# Patient Record
Sex: MALE | Race: WHITE | NOT HISPANIC OR LATINO
[De-identification: names, ages, dates, MRNs, and addresses within clinical notes are randomized per-mention and may not be internally consistent; named-entity substitution may affect disease eponyms.]

---

## 2019-12-02 ENCOUNTER — RESULT REVIEW (OUTPATIENT)
Age: 64
End: 2019-12-02

## 2021-06-14 PROBLEM — Z00.00 ENCOUNTER FOR PREVENTIVE HEALTH EXAMINATION: Status: ACTIVE | Noted: 2021-06-14

## 2021-06-15 ENCOUNTER — NON-APPOINTMENT (OUTPATIENT)
Age: 66
End: 2021-06-15

## 2021-06-15 DIAGNOSIS — Z86.39 PERSONAL HISTORY OF OTHER ENDOCRINE, NUTRITIONAL AND METABOLIC DISEASE: ICD-10-CM

## 2021-06-15 DIAGNOSIS — J30.9 ALLERGIC RHINITIS, UNSPECIFIED: ICD-10-CM

## 2021-06-15 DIAGNOSIS — M51.9 UNSPECIFIED THORACIC, THORACOLUMBAR AND LUMBOSACRAL INTERVERTEBRAL DISC DISORDER: ICD-10-CM

## 2021-06-15 DIAGNOSIS — Z87.09 PERSONAL HISTORY OF OTHER DISEASES OF THE RESPIRATORY SYSTEM: ICD-10-CM

## 2021-06-15 DIAGNOSIS — Z82.5 FAMILY HISTORY OF ASTHMA AND OTHER CHRONIC LOWER RESPIRATORY DISEASES: ICD-10-CM

## 2021-06-15 DIAGNOSIS — Z87.39 PERSONAL HISTORY OF OTHER DISEASES OF THE MUSCULOSKELETAL SYSTEM AND CONNECTIVE TISSUE: ICD-10-CM

## 2021-06-15 DIAGNOSIS — Z80.1 FAMILY HISTORY OF MALIGNANT NEOPLASM OF TRACHEA, BRONCHUS AND LUNG: ICD-10-CM

## 2021-06-15 DIAGNOSIS — F17.200 NICOTINE DEPENDENCE, UNSPECIFIED, UNCOMPLICATED: ICD-10-CM

## 2021-06-15 DIAGNOSIS — Z82.0 FAMILY HISTORY OF EPILEPSY AND OTHER DISEASES OF THE NERVOUS SYSTEM: ICD-10-CM

## 2021-06-15 RX ORDER — DOCUSATE SODIUM 100 MG/1
100 CAPSULE ORAL
Refills: 0 | Status: ACTIVE | COMMUNITY

## 2021-06-15 RX ORDER — ATORVASTATIN CALCIUM 10 MG/1
10 TABLET, FILM COATED ORAL
Refills: 0 | Status: ACTIVE | COMMUNITY

## 2021-06-15 RX ORDER — OMEPRAZOLE 20 MG/1
20 CAPSULE, DELAYED RELEASE ORAL
Refills: 0 | Status: ACTIVE | COMMUNITY

## 2021-06-18 ENCOUNTER — NON-APPOINTMENT (OUTPATIENT)
Age: 66
End: 2021-06-18

## 2021-06-18 ENCOUNTER — APPOINTMENT (OUTPATIENT)
Dept: GASTROENTEROLOGY | Facility: CLINIC | Age: 66
End: 2021-06-18
Payer: COMMERCIAL

## 2021-06-18 VITALS
TEMPERATURE: 97.6 F | BODY MASS INDEX: 28.31 KG/M2 | DIASTOLIC BLOOD PRESSURE: 74 MMHG | WEIGHT: 209 LBS | SYSTOLIC BLOOD PRESSURE: 136 MMHG | HEIGHT: 72 IN

## 2021-06-18 PROCEDURE — 99215 OFFICE O/P EST HI 40 MIN: CPT

## 2021-06-18 PROCEDURE — 99072 ADDL SUPL MATRL&STAF TM PHE: CPT

## 2021-06-18 NOTE — REVIEW OF SYSTEMS
[Fever] : no fever [Chills] : no chills [Dysuria] : no dysuria [Skin Lesions] : no skin lesions [Confused] : no confusion [Suicidal] : not suicidal [Proptosis] : no proptosis [Easy Bruising] : no tendency for easy bruising

## 2021-06-18 NOTE — HISTORY OF PRESENT ILLNESS
[de-identified] : \par \par This HPI reflects a summary and review of records : including previous and most recent  Labs, body imaging, consults and progress notes, operative and pathology reports, EKG reports, ED records, found in [employer], [employer],  [employer] and any additional records brought in by  the patient at the time of the visit.\par \par \par \par PCP:  Francis\par \par 66 yo M w h/o asthma, HLD, Allergies, OA, Lumbar Disc Dis\par GERD w LPR, Gastric Polyps, Gastritis w + IM\par Colon Polyps, Hemorrhoids, IBS/Proctalgia Fugax\par \par 10/22/18 Colonoscopy: L-sided Hypertrophy, 2nd deg int hemorhoids\par 3/7/2017 EGD:  gastritis, no HP, 0.5cm antral nodule, +IM, gastric polyp: fundic, 1cm HH, GE-ajar, \par Esophagitis A--, no Barretts;  VC:TR\par \par reviewed endoscopic findings and pathology in detail with patient\par patients questioned answered\par \par \par Today:  Feeling well, no c/o , CP, SOB/ SWAIN, Cough, Wheeze, Palpitations, edema\par \par \par * Abd pain-no\par * Nausea-no\par * Vomit-no\par * Early satiety--no\par * Belching-no\par * Hiccups--no\par * Regurgitation-no\par * Acid Taste / Water Brash-no\par * Ht burn-no\par * Throat Clearing-intermittent \par * Post-Nasal Drip-no\par * Congestion-no\par * Globus-occas \par * Cough-no\par * Wheeze / PC--no\par * Hoarseness-no\par * Dysphagia-no\par * BMs: # 3/5 qd\par * Constipation-no\par * Diarrhea-no\par * Bloating-no\par * Strain on Defecation--no\par * Incompl Evac--no\par * Flatulence-no\par * Gurgling-no\par * Melena-no\par * BPBPR-no\par * Anorexia-no\par * Wt. Loss-no\par \par

## 2021-06-18 NOTE — ASSESSMENT
[FreeTextEntry1] : \par \par \par \par \par 1. GERD:  well  -  controlled,  no ht burn,  dysphagia, occas  throat clear\par * ++   LPR,  No Kilgore’s w No Dysplasia,  + h/o  Esophagitis grade: A--was found \par \par Recommend: \par * Anti-reflux diet & life-style changes reviewed & re-emphasized.  ++    Bedge required\par * Weight reduction & regular exercise emphasized\par \par *  ++   PPI:  omep 40mg qd ,  No  H2B q HS:  ,  No Carafate  1 gram:   --was emphasized\par I reviewed & summarized the prospective randomized & retrospective non-randomized studies\par looking at potential long term SE's of PPIs, w special attention to associations & actual cause\par as related to GI infections, bone loss, cognitive changes, KD, Covid, vitamin & electrolyte deficiencies\par questions were answered, pt advised that PPIs should be used when needed as indicated by their\par clinical indication and response and tapering off is always the goal if possible. pt understood.\par \par * No F/U  EGD:  [   8   ] mo.  /  [  2021     ] yr  --for Barretts screening / surveillance \par * No  need for pH Monitor,  No  Manometry,  No  Esophagram -- was emphasized \par *  ++  need for ENT  eval/F/U,  No  need for Surgical  eval  --  was emphasized \par \par \par \par \par \par 2. Gastritis :  well - controlled,  No N, V, early satiety, pain\par * No H.Pylori, + H/O  IM, No Bile, No NSAID , No ETOH exposure-- was found\par Recommended and reviewed: \par * Avoid NSAIDs / ETOH--have been shown to exacerbate and possible lead to cx's & GIBs\par * No Prevpac, No Pylera, is needed x  days\par * ++  PPI: Omep 40mg qd  x 90  days, or  No Carafate 1 gm QID is needed\par * ++  F/U EGD   is needed\par \par \par \par \par \par 3. Irritable Bowel Syndrome / ? Proctalgia Fugax   well - controlled.  No pain,  constipation,  diarrhea,  bloat\par Recommend: \par * Diet: High Fiber,  Low Fat & Lactose free, Low FODMAPs--was reviewed & emphasized\par * Daily fluid intake was reviewed : 6  --  8 cups of decaffeinated fluid daily was emphasized\par * Regular aerobic exercise was emphasized\par * Probiotics  1  daily\par * Miralax / Linzess/Amitiza--no required\par * Neuromodulation: reviewed but not required\par \par \par \par \par \par 4. History of gastric / Colon polyps\par see gastric/colorectal cancer screening\par \par \par \par \par \par 5. Colorectal  / Gastric Neoplasia Screening:  to be evaluated\par   Utilizing teaching posters and anatomical models the following were discussed and emphasized with the patient in detail: \par * Discussed the pre-malignant potential of polyps\par * Discussed the importance of f/u surveillance / screening colonoscopy\par * High-Fiber Diet was reviewed and emphasized\par * Anti-oxidants and ASA/NSAID Therapy reviewed\par * Given age > 40-51 yo & +PH Gastric/ Colonic Polyps \par * Recommend Colonoscopy--19/2023,  EGD--2021 \par * R/O  Colonic / Gastric  Neoplasia\par \par \par \par \par 6. Hemorrhoids:  well - controlled.  No pain,  swelling,  itch,  bleeding\par * Discussed the potential complications of thrombosis,  pain,  infection,  swelling, itching,  bleeding \par Recommendations: \par * High - Fiber Diet was reviewed and emphasized\par * 6  --  8 cups of decaffeinated fluid daily was emphasized \par * Sitz Bathes BID,  No:  Anusol HC  Suppos / Cream  FL BID -- was needed\par * No:  Tucks BID,   No:  Balneol Lotion,   No:  Calmoseptine Oint -- was needed ;    ++ Prep H prn\par * No:  need for  Colorectal surgical evaluation for possible ablation w Dr --  was emphasized\par \par \par \par \par \par Informed Consent:\par * The risks & Benefits of EGD   were discussed w patient.\par * This included but was not limited to perforation, bleeding, sedation /med rxns possibly requiring surgery, blood transfusions, antibiotics & CPR/Intubation.\par * Pt. understands & agrees to the procedures.\par The following instructions in regards to the prep and medically essential ( cardiac, pulmonary, sz, psych, endocrine)  pre-op medication administration\par was reviewed and emphasized with the patient . \par * Pt. advised to D/C  ASA/NSAIDs  7 Days  PTP.\par * [ +++ ]  Dulcolax / Miralax / Mag. Citrate ,  [     ] Prepopik/ Clenpiq ,  [     ] Osmo Prep,  [    ] GoLytely,  prep. reviewed w Pt.\par * Hold  [           ] AM of procedure.\par * Hold  [           ] PM of procedure.\par * Take  [           ] PM of procedure.\par * Take  [           ] AM of procedure.\par \par

## 2021-08-05 ENCOUNTER — RESULT REVIEW (OUTPATIENT)
Age: 66
End: 2021-08-05

## 2021-08-06 ENCOUNTER — APPOINTMENT (OUTPATIENT)
Dept: GASTROENTEROLOGY | Facility: HOSPITAL | Age: 66
End: 2021-08-06

## 2022-01-28 ENCOUNTER — NON-APPOINTMENT (OUTPATIENT)
Age: 67
End: 2022-01-28

## 2022-02-07 ENCOUNTER — APPOINTMENT (OUTPATIENT)
Dept: GASTROENTEROLOGY | Facility: CLINIC | Age: 67
End: 2022-02-07
Payer: COMMERCIAL

## 2022-02-07 VITALS
WEIGHT: 213 LBS | DIASTOLIC BLOOD PRESSURE: 76 MMHG | BODY MASS INDEX: 28.85 KG/M2 | HEART RATE: 67 BPM | SYSTOLIC BLOOD PRESSURE: 120 MMHG | HEIGHT: 72 IN

## 2022-02-07 DIAGNOSIS — K21.9 GASTRO-ESOPHAGEAL REFLUX DISEASE W/OUT ESOPHAGITIS: ICD-10-CM

## 2022-02-07 PROCEDURE — 99215 OFFICE O/P EST HI 40 MIN: CPT

## 2022-02-07 NOTE — HISTORY OF PRESENT ILLNESS
Followed in lymphedema clinic.   [de-identified] : \par \par This HPI reflects a summary and review of records : including previous and most recent  Labs, body imaging, consults and progress notes, operative and pathology reports, EKG reports, ED records, found in [employer], [employer],  [employer] and any additional records brought in by  the patient at the time of the visit.\par \par \par PCP:  Francis\par \par 65 yo M w h/o asthma, HLD, Allergies, OA, Lumbar Disc Dis\par GERD w LPR, Gastric Polyps, Gastritis w + IM\par Colon Polyps, Hemorrhoids, IBS/Proctalgia Fugax\par \par 10/22/18 Colonoscopy: L-sided Hypertrophy, 2nd deg int hemorhoids\par 3/7/2017 EGD:  gastritis, no HP, 0.5cm antral nodule, +IM, gastric polyp: fundic, 1cm HH, GE-ajar, \par Esophagitis A--, no Barretts;  VC:TR\par 1/18/21 : * BMs: # 3/5 qd; * Throat Clearing-intermittent \par \par 8/6/21 EGD: gastritis, mild, No HP; 075cm gastric polyp: fundic\par                      2cm HH, Esophagits A--, No Kilgore's,   VC: TR mucous\par \par \par reviewed endoscopic findings and pathology in detail with patient: 8/6/21 EGD\par patients questioned answered\par \par \par 2/7/22 Today:  Feeling well, no c/o , CP, SOB/ SWAIN, Cough, Wheeze, Palpitations, edema\par \par on Omep 20mg qd \par * Abd pain-no\par * Nausea-no\par * Vomit-no\par * Early satiety--no\par * Belching-no\par * Hiccups--no\par * Regurgitation-no\par * Acid Taste / Water Brash-no\par * Ht burn-no\par * Throat Clearing-intermittent--> occas \par * Post-Nasal Drip-no\par * Congestion-no\par * Globus-occas \par * Cough-no\par * Wheeze / PC--no\par * Hoarseness-no\par * Dysphagia-no\par * BMs: --> # 4qd \par * Constipation-no\par * Diarrhea-no\par * Bloating-no\par * Strain on Defecation--no\par * Incompl Evac--no\par * Flatulence-no\par * Gurgling-no\par * Melena-no\par * BPBPR-no\par * Anorexia-no\par * Wt. Loss-no\par \par

## 2022-02-07 NOTE — ASSESSMENT
[FreeTextEntry1] : \par \par \par \par \par 1. GERD:  well  -  controlled,  no ht burn,  dysphagia,  occas throat clear\par * ++   LPR,  No Kilgore’s ,  + h/o  Esophagitis grade: A--was found \par \par Recommend: \par * Anti-reflux diet & life-style changes reviewed & re-emphasized.  ++    Bedge required\par * Weight reduction & regular exercise emphasized\par \par *  ++   PPI:  omep 40mg qd --> taper to 20mg qd \par No  H2B q HS:  ,  No Carafate  1 gram:   --\par I reviewed & summarized the prospective randomized & retrospective non-randomized studies\par looking at potential long term SE's of PPIs, w special attention to associations & actual cause\par as related to GI infections, bone loss, cognitive changes, KD, Covid, vitamin & electrolyte deficiencies\par questions were answered, pt advised that PPIs should be used when needed as indicated by their\par clinical indication and response and tapering off is always the goal if possible. pt understood.\par \par * No F/U  EGD:  8/ 2024  yr  --for Barretts screening / surveillance \par * No  need for pH Monitor, Manometry,  Esophagram -- \par *  ++  need for ENT  eval/F/U,  No  need for Surgical  eval  -- \par \par \par \par \par \par 2. Gastritis :  well - controlled,  No N, V, early satiety, pain\par * No H.Pylori, + H/O  IM, No Bile, No NSAID , No ETOH exposure-- was found\par Recommended and reviewed: \par * Avoid NSAIDs / ETOH--have been shown to exacerbate and possible lead to cx's & GIBs\par * No Prevpac, No Pylera, is needed\par * ++  PPI: Omep 40mg qd  x 90  days,  \par No Carafate 1 gm QID is needed\par * ++  F/U EGD   is needed--> 8/2024 \par \par \par \par \par \par 3. Irritable Bowel Syndrome / ? Proctalgia Fugax   well - controlled.  No pain,  constipation,  diarrhea,  bloat\par Recommend: \par * Diet: High Fiber,  Low Fat & Lactose free, Low FODMAPs--was  emphasized\par * Daily fluid intake was reviewed : 6  --  8 cups of decaffeinated fluid daily was emphasized\par * Regular aerobic exercise was emphasized\par * Probiotics  1  daily\par * Miralax / Linzess/Amitiza--not required\par * Neuromodulation: not required\par \par \par \par \par \par 4. History of gastric / Colon polyps\par see gastric/colorectal cancer screening\par \par \par \par \par \par 5. Colorectal  / Gastric Neoplasia Screening:  to be evaluated\par   Utilizing teaching posters and anatomical models the following were discussed and emphasized with the patient in detail: \par * Discussed the pre-malignant potential of polyps\par * Discussed the importance of f/u surveillance / screening colonoscopy\par * High-Fiber Diet was emphasized\par * Anti-oxidants and ASA/NSAID Therapy reviewed\par * Given age > 40-49 yo & +PH Gastric/ Colonic Polyps \par * Recommend Colonoscopy--2023 ,  EGD--2024 to   R/O  Colonic / Gastric  Neoplasia\par \par \par \par \par 6. Hemorrhoids:  well - controlled.  No pain,  swelling,  itch,  bleeding\par * Discussed previously the  potential complications of thrombosis,  pain,  infection,  swelling, itching,  bleeding --none recently\par Recommendations: \par * Moderate-   Fiber Diet was  emphasized\par * 6  --  8 cups of decaffeinated fluid daily was emphasized \par * Sitz Bathes as needed ,  No:  Anusol HC  Suppos / Cream  CT BID -- was needed\par * No:  Tucks BID,  Balneol Lotion,   Calmoseptine Oint -- was needed ;    can use  Prep H prn\par * No:  need for  Colorectal surgical evaluation for possible ablation \par \par \par \par \par \par \par

## 2022-02-07 NOTE — REVIEW OF SYSTEMS
[Fever] : no fever [Chills] : no chills [Red Eyes] : eyes not red [Skin Lesions] : no skin lesions [Confused] : no confusion [Proptosis] : no proptosis [Easy Bruising] : no tendency for easy bruising

## 2023-10-28 ENCOUNTER — NON-APPOINTMENT (OUTPATIENT)
Age: 68
End: 2023-10-28

## 2023-12-04 ENCOUNTER — APPOINTMENT (OUTPATIENT)
Dept: GASTROENTEROLOGY | Facility: CLINIC | Age: 68
End: 2023-12-04
Payer: COMMERCIAL

## 2023-12-04 DIAGNOSIS — K64.8 OTHER HEMORRHOIDS: ICD-10-CM

## 2023-12-04 DIAGNOSIS — K63.5 POLYP OF COLON: ICD-10-CM

## 2023-12-04 DIAGNOSIS — Z12.11 ENCOUNTER FOR SCREENING FOR MALIGNANT NEOPLASM OF COLON: ICD-10-CM

## 2023-12-04 DIAGNOSIS — K29.50 UNSPECIFIED CHRONIC GASTRITIS W/OUT BLEEDING: ICD-10-CM

## 2023-12-04 DIAGNOSIS — Z87.19 PERSONAL HISTORY OF OTHER DISEASES OF THE DIGESTIVE SYSTEM: ICD-10-CM

## 2023-12-04 DIAGNOSIS — K31.7 POLYP OF STOMACH AND DUODENUM: ICD-10-CM

## 2023-12-04 DIAGNOSIS — K31.A0 GASTRIC INTESTINAL METAPLASIA, UNSPECIFIED: ICD-10-CM

## 2023-12-04 DIAGNOSIS — K21.00 GASTRO-ESOPHAGEAL REFLUX DISEASE WITH ESOPHAGITIS, WITHOUT BLEEDING: ICD-10-CM

## 2023-12-04 PROCEDURE — 99215 OFFICE O/P EST HI 40 MIN: CPT

## 2023-12-26 ENCOUNTER — APPOINTMENT (OUTPATIENT)
Dept: FAMILY MEDICINE | Facility: CLINIC | Age: 68
End: 2023-12-26

## 2024-02-15 ENCOUNTER — RESULT REVIEW (OUTPATIENT)
Age: 69
End: 2024-02-15

## 2024-02-16 ENCOUNTER — APPOINTMENT (OUTPATIENT)
Dept: GASTROENTEROLOGY | Facility: HOSPITAL | Age: 69
End: 2024-02-16

## 2024-06-07 ENCOUNTER — APPOINTMENT (OUTPATIENT)
Dept: DERMATOLOGY | Facility: CLINIC | Age: 69
End: 2024-06-07
Payer: COMMERCIAL

## 2024-06-07 VITALS — HEIGHT: 72 IN | WEIGHT: 215 LBS | BODY MASS INDEX: 29.12 KG/M2

## 2024-06-07 DIAGNOSIS — D22.9 MELANOCYTIC NEVI, UNSPECIFIED: ICD-10-CM

## 2024-06-07 DIAGNOSIS — D48.5 NEOPLASM OF UNCERTAIN BEHAVIOR OF SKIN: ICD-10-CM

## 2024-06-07 PROCEDURE — 99203 OFFICE O/P NEW LOW 30 MIN: CPT

## 2024-06-07 NOTE — ASSESSMENT
[FreeTextEntry1] : 1) Nevi - Counseled about clinically benign lesions - Discussed regular OTC sunscreen use, SPF 30 or higher   2) Neoplasm of uncertain behavior of skin, forehead - likely BCC - Recommended biopsy. Pt deferred biopsy today due to an upcoming event in the next few days. Will schedule an appointment in a few weeks for a biopsy  3) Skin cancer Screening - No lesions clinically concerning for malignancy today - Discussed regular self skin checks and ABCDEs of skin cancer screening - Discussed regular sunscreen use - Pt instructed to report any new or changing lesions  RTC 1 yr for FBSE or sooner if any concerns   OK to OB for biopsy for the forehead lesion

## 2024-06-07 NOTE — HISTORY OF PRESENT ILLNESS
[FreeTextEntry1] : moles [de-identified] : # Mole/skin check Concerns today: spot on forehead, rash on legs which is inactive today Sunscreen use: none  Hx of blistering sun burns:   Personal history of skin cancer: BCC in 2019  Family history of skin cancer: cousin hx of melanoma

## 2024-06-07 NOTE — PHYSICAL EXAM
[FreeTextEntry3] : desquamation on the legs pearly papule on the forehead Fairly uniform and regular brown macules and papules on the trunk and extremities

## 2024-06-08 ENCOUNTER — NON-APPOINTMENT (OUTPATIENT)
Age: 69
End: 2024-06-08

## 2024-06-08 NOTE — ASSESSMENT
[FreeTextEntry1] : 1. GERD: well - controlled, no ht burn, dysphagia, occas throat clear * ++ LPR, No Kilgore's , + h/o Esophagitis grade: A--was found Recommend: * Anti-reflux diet & life-style changes reviewed & re-emphasized. ++ Bedge required * Weight reduction & regular exercise emphasized * ++ PPI: omep 40mg qd --> taper to 20mg qd I  previously reviewed & summarized the prospective randomized & retrospective non-randomized studies looking at potential long term SE's of PPIs, w special attention to associations & actual cause as related to GI infections, bone loss, cognitive changes, KD, Covid, vitamin & electrolyte deficiencies questions were answered, pt advised that PPIs should be used when needed as indicated by their clinical indication and response and tapering off is always the goal if possible. pt understood. *  F/U EGD: 8/ 2024 yr --for Barretts screening / surveillance * No need for pH Monitor, Manometry, Esophagram -- * ++ need for ENT eval/F/U, No need for Surgical eval --       2. Gastritis : well - controlled, No N, V, early satiety, pain * No H.Pylori, + H/O IM, No Bile, No NSAID , No ETOH exposure-- was found Recommended and reviewed: * Avoid NSAIDs / ETOH--have been shown to exacerbate and possible lead to cx's & GIBs * No Prevpac, No Pylera, is needed * ++ PPI: Omep 20mg qd x 90 days, No Carafate 1 gm QID is needed * ++ F/U EGD is needed--> 8/2024       3. Irritable Bowel Syndrome / ? Proctalgia Fugax:    well - controlled. No pain, constipation, diarrhea, bloat Recommend: * Diet: High Fiber, Low Fat & Lactose free, Low FODMAPs--was emphasized * Daily fluid intake was reviewed : 6 -- 8 cups of decaffeinated fluid daily was emphasized * Regular aerobic exercise was emphasized * Probiotics 1 daily * Miralax / Linzess/Amitiza--not required * Neuromodulation: not required        4. Colorectal / Gastric Neoplasia Screening: to be evaluated  Utilizing teaching posters and anatomical models the following were discussed and emphasized with the patient in detail: * Discussed the pre-malignant potential of polyps * Discussed the importance of f/u surveillance / screening colonoscopy/EGD * High-Fiber Diet was emphasized * Anti-oxidants and ASA/NSAID Therapy reviewed * Given age > 40-51 yo & +PH Gastric/ Colonic Polyps * Recommend Colonoscopy--20229 , EGD--2024 to R/O Colonic / Gastric Neoplasia      5. Hemorrhoids: well - controlled. No pain, swelling, itch, bleeding * Discussed previously the potential complications of thrombosis, pain, infection, swelling, itching, bleeding --none recently Recommendations: * Moderate- Fiber Diet was emphasized * 6 -- 8 cups of decaffeinated fluid daily was emphasized * Sitz Bathes as needed , No: Anusol HC Suppos / Cream CT BID -- was needed * No: Tucks BID, Balneol Lotion, Calmoseptine Oint -- was needed ; can use Prep H prn * No: need for Colorectal surgical evaluation for possible ablation        Informed Consent: * The risks & Benefits of  EGD  were discussed w patient. * This included but was not limited to perforation, bleeding, sedation /med rxns, missed lesions possibly requiring surgery, blood transfusions, antibiotics & CPR/Intubation. * Pt. understands & agrees to the procedures. The following instructions in regards to the prep and medically essential ( cardiac, pulmonary, sz, psych, endocrine)  pre-op medication administration was reviewed and emphasized with the patient .  * Pt. advised to D/C  ASA/NSAIDs  7  Days   PTP. * [ +++ ]  Dulcolax / Miralax / Mag. Citrate ,  [     ] Prepopik/ Clenpiq ,  [     ] Osmo Prep,  [    ] GoLytely,  prep. reviewed w Pt. * Hold  [           ] AM of procedure. * Hold  [           ] PM  before procedure. * Take  [           ] PM  before procedure. * Take  [           ] AM of procedure.

## 2024-06-08 NOTE — HISTORY OF PRESENT ILLNESS
[de-identified] : This HPI reflects a summary and review of records : including previous and most recent  Labs, body imaging, consults and progress notes, operative and pathology reports, EKG reports, ED records, found in [employer], [employer]  [employer] [facility] and any additional records brought in by  the patient at the time of the visit.   PCP:  Tito  67 yo M w h/o asthma, HLD, Allergies, OA, Lumbar Disc Dis GERD w LPR, Gastric Polyps, Gastritis w + IM Colon Polyps, Hemorrhoids, IBS/Proctalgia Fugax  10/22/18 Colonoscopy: L-sided Hypertrophy, 2nd deg int hemorhoids 3/7/17 EGD:  gastritis, no HP, 0.5cm antral nodule, +IM, gastric polyp: fundic, 1cm HH, GE-ajar,  Esophagitis A--, no Barretts;  VC:TR 1/18/21 : * BMs: # 3/5 qd; * Throat Clearing-intermittent   8/6/21 EGD: gastritis, mild, No HP; 075cm gastric polyp: fundic                      2cm HH, Esophagits A--, No Kilgore's,   VC: TR mucous 2/7/22 : Occas throat clear/globus;  BMs: # 4 qd  12/4/23: Occas throat clear;  BMs: # 4 qd    2/16/24 Colonoscopy: 0.75cm TV polyp; adenoma  & 0.5cm rectal polyps:HP   ; 2nd eg int hemorrhoids   Today:  Feeling well, no c/o , CP, SOB/ SWAIN, Cough, Wheeze, Palpitations, edema  reviewed endoscopic findings and pathology in detail with patient: 2/16/24 Colonoscopy patients questioned answered  7/15/24   Today:   * Abd pain-->no * Nausea--> no * Vomit--> no * Early satiety--> no * Belching--> no * Hiccups--> no * Regurgitation--> no * Acid Taste / Water Brash--> no * Ht burn--> no * Dysphagia--> no * Throat Clearing-->  * Hoarseness--> no * Post-Nasal Drip--> no * Congestion--> no * Globus-->  * Cough--> no * Wheeze / PC-> -no * BMs: # 4 qd * Constipation--> no * Diarrhea--> no * Bloating--> no * Strain on Defecation--> no * Incompl Evac--> no * Flatulence--> no * Gurgling--> no * Melena--> no * BPBPR-> -no * Anorexia--> no * Wt. Loss--> no

## 2024-06-08 NOTE — PHYSICAL EXAM
[General Appearance - Alert] : alert [General Appearance - In No Acute Distress] : in no acute distress [] : no respiratory distress [Sclera] : the sclera and conjunctiva were normal [Auscultation Breath Sounds / Voice Sounds] : lungs were clear to auscultation bilaterally [Heart Rate And Rhythm] : heart rate was normal and rhythm regular [Heart Sounds] : normal S1 and S2 [Heart Sounds Gallop] : no gallops [Murmurs] : no murmurs [Heart Sounds Pericardial Friction Rub] : no pericardial rub [Edema] : there was no peripheral edema [Abnormal Walk] : normal gait [Skin Color & Pigmentation] : normal skin color and pigmentation [Oriented To Time, Place, And Person] : oriented to person, place, and time [Flat] : flat [Normal] : normal [Soft, Nontender] : the abdomen was soft and nontender [No Mass] : no masses were palpated [No HSM] : no hepatosplenomegaly noted

## 2024-07-15 ENCOUNTER — APPOINTMENT (OUTPATIENT)
Dept: GASTROENTEROLOGY | Facility: CLINIC | Age: 69
End: 2024-07-15
Payer: COMMERCIAL

## 2024-07-15 DIAGNOSIS — K21.00 GASTRO-ESOPHAGEAL REFLUX DISEASE WITH ESOPHAGITIS, WITHOUT BLEEDING: ICD-10-CM

## 2024-07-15 DIAGNOSIS — Z12.11 ENCOUNTER FOR SCREENING FOR MALIGNANT NEOPLASM OF COLON: ICD-10-CM

## 2024-07-15 DIAGNOSIS — K63.5 POLYP OF COLON: ICD-10-CM

## 2024-07-15 DIAGNOSIS — K64.8 OTHER HEMORRHOIDS: ICD-10-CM

## 2024-07-15 DIAGNOSIS — K29.50 UNSPECIFIED CHRONIC GASTRITIS W/OUT BLEEDING: ICD-10-CM

## 2024-07-15 DIAGNOSIS — Z87.19 PERSONAL HISTORY OF OTHER DISEASES OF THE DIGESTIVE SYSTEM: ICD-10-CM

## 2024-07-15 DIAGNOSIS — K31.A0 GASTRIC INTESTINAL METAPLASIA, UNSPECIFIED: ICD-10-CM

## 2024-07-15 DIAGNOSIS — K31.7 POLYP OF STOMACH AND DUODENUM: ICD-10-CM

## 2024-07-15 PROCEDURE — 99214 OFFICE O/P EST MOD 30 MIN: CPT

## 2024-10-25 ENCOUNTER — APPOINTMENT (OUTPATIENT)
Dept: GASTROENTEROLOGY | Facility: HOSPITAL | Age: 69
End: 2024-10-25

## 2024-12-26 ENCOUNTER — APPOINTMENT (OUTPATIENT)
Dept: FAMILY MEDICINE | Facility: CLINIC | Age: 69
End: 2024-12-26
Payer: COMMERCIAL

## 2024-12-26 VITALS
TEMPERATURE: 98.5 F | HEIGHT: 71.5 IN | HEART RATE: 60 BPM | BODY MASS INDEX: 29.85 KG/M2 | DIASTOLIC BLOOD PRESSURE: 82 MMHG | WEIGHT: 218 LBS | OXYGEN SATURATION: 96 % | SYSTOLIC BLOOD PRESSURE: 138 MMHG

## 2024-12-26 DIAGNOSIS — Z13.29 ENCOUNTER FOR SCREENING FOR OTHER SUSPECTED ENDOCRINE DISORDER: ICD-10-CM

## 2024-12-26 DIAGNOSIS — Z00.00 ENCOUNTER FOR GENERAL ADULT MEDICAL EXAMINATION W/OUT ABNORMAL FINDINGS: ICD-10-CM

## 2024-12-26 DIAGNOSIS — Z13.1 ENCOUNTER FOR SCREENING FOR DIABETES MELLITUS: ICD-10-CM

## 2024-12-26 DIAGNOSIS — Z12.5 ENCOUNTER FOR SCREENING FOR MALIGNANT NEOPLASM OF PROSTATE: ICD-10-CM

## 2024-12-26 DIAGNOSIS — K21.00 GASTRO-ESOPHAGEAL REFLUX DISEASE WITH ESOPHAGITIS, WITHOUT BLEEDING: ICD-10-CM

## 2024-12-26 DIAGNOSIS — E78.5 HYPERLIPIDEMIA, UNSPECIFIED: ICD-10-CM

## 2024-12-26 PROCEDURE — G0444 DEPRESSION SCREEN ANNUAL: CPT | Mod: 59

## 2024-12-26 PROCEDURE — 99387 INIT PM E/M NEW PAT 65+ YRS: CPT

## 2024-12-27 PROBLEM — E78.5 HYPERLIPIDEMIA: Status: ACTIVE | Noted: 2024-12-26

## 2024-12-27 PROBLEM — Z12.5 SCREENING PSA (PROSTATE SPECIFIC ANTIGEN): Status: ACTIVE | Noted: 2024-12-26

## 2024-12-27 PROBLEM — Z00.00 ANNUAL PHYSICAL EXAM: Status: ACTIVE | Noted: 2024-12-26

## 2024-12-30 DIAGNOSIS — R73.03 PREDIABETES.: ICD-10-CM

## 2024-12-30 LAB
ALBUMIN SERPL ELPH-MCNC: 4.1 G/DL
ALP BLD-CCNC: 74 U/L
ALT SERPL-CCNC: 35 U/L
ANION GAP SERPL CALC-SCNC: 8 MMOL/L
AST SERPL-CCNC: 18 U/L
BASOPHILS # BLD AUTO: 0.07 K/UL
BASOPHILS NFR BLD AUTO: 1 %
BILIRUB SERPL-MCNC: 0.5 MG/DL
BUN SERPL-MCNC: 15 MG/DL
CALCIUM SERPL-MCNC: 9.6 MG/DL
CHLORIDE SERPL-SCNC: 108 MMOL/L
CHOLEST SERPL-MCNC: 214 MG/DL
CO2 SERPL-SCNC: 28 MMOL/L
CREAT SERPL-MCNC: 1.23 MG/DL
EGFR: 64 ML/MIN/1.73M2
EOSINOPHIL # BLD AUTO: 0.08 K/UL
EOSINOPHIL NFR BLD AUTO: 1.1 %
ESTIMATED AVERAGE GLUCOSE: 120 MG/DL
GLUCOSE SERPL-MCNC: 90 MG/DL
HBA1C MFR BLD HPLC: 5.8 %
HCT VFR BLD CALC: 43.6 %
HDLC SERPL-MCNC: 39 MG/DL
HGB BLD-MCNC: 14.5 G/DL
IMM GRANULOCYTES NFR BLD AUTO: 0.3 %
LDLC SERPL CALC-MCNC: 131 MG/DL
LYMPHOCYTES # BLD AUTO: 1.69 K/UL
LYMPHOCYTES NFR BLD AUTO: 23.7 %
MAN DIFF?: NORMAL
MCHC RBC-ENTMCNC: 29.5 PG
MCHC RBC-ENTMCNC: 33.3 G/DL
MCV RBC AUTO: 88.8 FL
MONOCYTES # BLD AUTO: 0.69 K/UL
MONOCYTES NFR BLD AUTO: 9.7 %
NEUTROPHILS # BLD AUTO: 4.59 K/UL
NEUTROPHILS NFR BLD AUTO: 64.2 %
NONHDLC SERPL-MCNC: 176 MG/DL
PLATELET # BLD AUTO: 301 K/UL
POTASSIUM SERPL-SCNC: 4.9 MMOL/L
PROT SERPL-MCNC: 6.4 G/DL
PSA FREE FLD-MCNC: 22 %
PSA FREE SERPL-MCNC: 0.23 NG/ML
PSA SERPL-MCNC: 1.03 NG/ML
RBC # BLD: 4.91 M/UL
RBC # FLD: 12.1 %
SODIUM SERPL-SCNC: 144 MMOL/L
TRIGL SERPL-MCNC: 254 MG/DL
TSH SERPL-ACNC: 2.49 UIU/ML
WBC # FLD AUTO: 7.14 K/UL

## 2025-01-03 ENCOUNTER — APPOINTMENT (OUTPATIENT)
Dept: AFTER HOURS CARE | Facility: EMERGENCY ROOM | Age: 70
End: 2025-01-03
Payer: COMMERCIAL

## 2025-01-03 PROBLEM — J06.9 URI, ACUTE: Status: ACTIVE | Noted: 2025-01-03 | Resolved: 2025-02-02

## 2025-01-03 PROCEDURE — 99204 OFFICE O/P NEW MOD 45 MIN: CPT

## 2025-01-03 RX ORDER — AZITHROMYCIN 250 MG/1
250 TABLET, FILM COATED ORAL
Qty: 1 | Refills: 0 | Status: ACTIVE | COMMUNITY
Start: 2025-01-03 | End: 1900-01-01

## 2025-01-03 RX ORDER — FLUTICASONE PROPIONATE 50 UG/1
50 SPRAY, METERED NASAL DAILY
Qty: 1 | Refills: 0 | Status: ACTIVE | COMMUNITY
Start: 2025-01-03 | End: 1900-01-01

## 2025-01-03 RX ORDER — ALBUTEROL SULFATE 90 UG/1
108 (90 BASE) INHALANT RESPIRATORY (INHALATION)
Qty: 1 | Refills: 1 | Status: ACTIVE | COMMUNITY
Start: 2025-01-03 | End: 1900-01-01

## 2025-01-10 ENCOUNTER — APPOINTMENT (OUTPATIENT)
Dept: FAMILY MEDICINE | Facility: CLINIC | Age: 70
End: 2025-01-10
Payer: COMMERCIAL

## 2025-01-10 VITALS
BODY MASS INDEX: 29.31 KG/M2 | WEIGHT: 214 LBS | HEART RATE: 67 BPM | HEIGHT: 71.5 IN | SYSTOLIC BLOOD PRESSURE: 110 MMHG | OXYGEN SATURATION: 98 % | TEMPERATURE: 96.3 F | DIASTOLIC BLOOD PRESSURE: 60 MMHG

## 2025-01-10 DIAGNOSIS — J06.9 ACUTE UPPER RESPIRATORY INFECTION, UNSPECIFIED: ICD-10-CM

## 2025-01-10 PROCEDURE — G2211 COMPLEX E/M VISIT ADD ON: CPT | Mod: NC

## 2025-01-10 PROCEDURE — 99214 OFFICE O/P EST MOD 30 MIN: CPT

## 2025-01-17 ENCOUNTER — APPOINTMENT (OUTPATIENT)
Dept: GASTROENTEROLOGY | Facility: HOSPITAL | Age: 70
End: 2025-01-17

## 2025-04-08 ENCOUNTER — APPOINTMENT (OUTPATIENT)
Dept: GASTROENTEROLOGY | Facility: CLINIC | Age: 70
End: 2025-04-08
Payer: COMMERCIAL

## 2025-04-08 VITALS
WEIGHT: 226 LBS | DIASTOLIC BLOOD PRESSURE: 80 MMHG | HEART RATE: 68 BPM | SYSTOLIC BLOOD PRESSURE: 146 MMHG | BODY MASS INDEX: 30.95 KG/M2 | HEIGHT: 71.5 IN

## 2025-04-08 DIAGNOSIS — K31.A0 GASTRIC INTESTINAL METAPLASIA, UNSPECIFIED: ICD-10-CM

## 2025-04-08 DIAGNOSIS — K21.00 GASTRO-ESOPHAGEAL REFLUX DISEASE WITH ESOPHAGITIS, WITHOUT BLEEDING: ICD-10-CM

## 2025-04-08 PROCEDURE — 99214 OFFICE O/P EST MOD 30 MIN: CPT

## 2025-05-14 ENCOUNTER — NON-APPOINTMENT (OUTPATIENT)
Age: 70
End: 2025-05-14

## 2025-05-20 ENCOUNTER — APPOINTMENT (OUTPATIENT)
Dept: GASTROENTEROLOGY | Facility: HOSPITAL | Age: 70
End: 2025-05-20

## 2025-05-20 ENCOUNTER — RESULT REVIEW (OUTPATIENT)
Age: 70
End: 2025-05-20

## 2025-05-20 PROCEDURE — 91035 G-ESOPH REFLX TST W/ELECTROD: CPT | Mod: 26

## 2025-06-06 ENCOUNTER — NON-APPOINTMENT (OUTPATIENT)
Age: 70
End: 2025-06-06